# Patient Record
Sex: FEMALE | Race: WHITE | NOT HISPANIC OR LATINO | Employment: OTHER | URBAN - METROPOLITAN AREA
[De-identification: names, ages, dates, MRNs, and addresses within clinical notes are randomized per-mention and may not be internally consistent; named-entity substitution may affect disease eponyms.]

---

## 2021-02-19 ENCOUNTER — IMMUNIZATION (OUTPATIENT)
Dept: NURSING | Facility: CLINIC | Age: 86
End: 2021-02-19
Payer: COMMERCIAL

## 2021-02-19 PROCEDURE — 91300 PR COVID VAC PFIZER DIL RECON 30 MCG/0.3 ML IM: CPT

## 2021-02-19 PROCEDURE — 0001A PR COVID VAC PFIZER DIL RECON 30 MCG/0.3 ML IM: CPT

## 2021-03-12 ENCOUNTER — IMMUNIZATION (OUTPATIENT)
Dept: NURSING | Facility: CLINIC | Age: 86
End: 2021-03-12
Attending: INTERNAL MEDICINE
Payer: COMMERCIAL

## 2021-03-12 PROCEDURE — 91300 PR COVID VAC PFIZER DIL RECON 30 MCG/0.3 ML IM: CPT

## 2021-03-12 PROCEDURE — 0002A PR COVID VAC PFIZER DIL RECON 30 MCG/0.3 ML IM: CPT

## 2021-03-21 ENCOUNTER — HEALTH MAINTENANCE LETTER (OUTPATIENT)
Age: 86
End: 2021-03-21

## 2021-09-05 ENCOUNTER — HEALTH MAINTENANCE LETTER (OUTPATIENT)
Age: 86
End: 2021-09-05

## 2022-04-17 ENCOUNTER — HEALTH MAINTENANCE LETTER (OUTPATIENT)
Age: 87
End: 2022-04-17

## 2022-10-23 ENCOUNTER — HEALTH MAINTENANCE LETTER (OUTPATIENT)
Age: 87
End: 2022-10-23

## 2023-06-01 ENCOUNTER — HEALTH MAINTENANCE LETTER (OUTPATIENT)
Age: 88
End: 2023-06-01

## 2023-10-12 ENCOUNTER — APPOINTMENT (OUTPATIENT)
Dept: GENERAL RADIOLOGY | Facility: CLINIC | Age: 88
End: 2023-10-12
Attending: EMERGENCY MEDICINE
Payer: COMMERCIAL

## 2023-10-12 ENCOUNTER — HOSPITAL ENCOUNTER (EMERGENCY)
Facility: CLINIC | Age: 88
Discharge: HOME | End: 2023-10-12
Attending: EMERGENCY MEDICINE | Admitting: EMERGENCY MEDICINE
Payer: COMMERCIAL

## 2023-10-12 VITALS
WEIGHT: 145 LBS | SYSTOLIC BLOOD PRESSURE: 160 MMHG | DIASTOLIC BLOOD PRESSURE: 59 MMHG | OXYGEN SATURATION: 96 % | TEMPERATURE: 97.9 F | HEART RATE: 59 BPM | RESPIRATION RATE: 18 BRPM

## 2023-10-12 DIAGNOSIS — S82.64XA CLOSED NONDISPLACED FRACTURE OF LATERAL MALLEOLUS OF RIGHT FIBULA, INITIAL ENCOUNTER: ICD-10-CM

## 2023-10-12 PROCEDURE — 99284 EMERGENCY DEPT VISIT MOD MDM: CPT | Mod: 25

## 2023-10-12 PROCEDURE — 27786 TREATMENT OF ANKLE FRACTURE: CPT | Mod: RT

## 2023-10-12 PROCEDURE — 250N000013 HC RX MED GY IP 250 OP 250 PS 637: Performed by: EMERGENCY MEDICINE

## 2023-10-12 PROCEDURE — 73610 X-RAY EXAM OF ANKLE: CPT | Mod: RT

## 2023-10-12 RX ORDER — ACETAMINOPHEN 325 MG/1
650 TABLET ORAL ONCE
Status: COMPLETED | OUTPATIENT
Start: 2023-10-12 | End: 2023-10-12

## 2023-10-12 RX ADMIN — ACETAMINOPHEN 650 MG: 325 TABLET, FILM COATED ORAL at 01:23

## 2023-10-12 NOTE — ED TRIAGE NOTES
Pt comes in for fall after falling asleep in chair and twisting R ankle and she heard a crunch. Pain severe with movement, unable to bear weight on foot. swelling seen

## 2023-10-12 NOTE — ED PROVIDER NOTES
History     Chief Complaint:  Ankle Pain       The history is provided by the patient.      Jessica Beal is a 88 year old female with a history of osteopenia who presents with a right ankle pain. Earlier tonight, she was sitting up from her chair when she twisted her right ankle and heard a crunch. She did not sustain any other injuries at the time. She continues to have pain in the same ankle since then, and it is exacerbated by movement. Additionally, she is unable to bear weight and the ankle is swollen. She did not take any medications for pain management prior to arrival. She is not on blood thinners.     Independent Historian:   None - Patient Only    Medications:    Lipitor     Past Medical History:    Systolic heart failure   PMR   HTN   Esophageal reflux   Palpitations   Pure hypercholesteremia   IBS   Malignant neoplasm of breast   Paroxsymal ventricular tachycardia   Osteopenia      Past Surgical History:    Cholecystectomy   R breast lumpectomy   L hand carpal tunnel release   R hemiarthroplasty     Physical Exam   Patient Vitals for the past 24 hrs:   BP Temp Temp src Pulse Resp SpO2 Weight   10/12/23 0010 (!) 160/59 97.9  F (36.6  C) Temporal 59 18 96 % 65.8 kg (145 lb)        Physical Exam  General: Sitting up in bed  Eyes:  The pupils are equal and round    Conjunctivae and sclerae are normal  ENT:    Atraumatic face  Neck:  Normal range of motion  CV:  Regular rate     DP/PT pulses 2+ on right    Skin warm and well perfused   Resp:  Non labored breathing on room air    No tachypnea    No cough heard  MS:  Mild swelling on the right lateral ankle tender on this area.  No right midfoot tenderness.  No tenderness on proximal fibula or tibia  Skin:  No rash or acute skin lesions noted  Neuro:   Awake, alert.      Speech is normal and fluent.    Face is symmetric.     Moves all extremities equally    Sensation intact to light touch on right foot  Psych: Normal affect.  Appropriate  interactions.      Emergency Department Course   Imaging:  Ankle XR, G/E 3 views, right  Final Result  IMPRESSION: Subtle undisplaced distal right fibular metaphyseal fracture. Ankle mortise is symmetric. Talar dome is smooth. Plantar calcaneal spur. Soft tissue swelling distal right leg and about the right ankle, more laterally.     Results per radiology     Emergency Department Course & Assessments:    Interventions:  0123 Tylenol 650 mg PO    Assessments:  0204 I obtained history and examined the patient as noted above.  0242 The patient was able to walk with a boot and walker. I discussed plan for discharge home.    Independent Interpretation (X-rays, CTs, rhythm strip):  I reviewed the x-ray -very subtle fracture seen on fibula    Consultations/Discussion of Management or Tests:  None     Social Determinants of Health affecting care:   None    Disposition:  The patient was discharged to home.     Impression & Plan    Medical Decision Making:  Jessica Beal is a 88-year-old female who presented to the emergency department with ankle pain.  She thinks her foot fell asleep after she fell asleep in the chair.  When she tried to get up, her foot rolled and she had immediate pain.  X-ray shows very subtle right nondisplaced fibular fracture.  Patient was placed in a boot.  Given that it is nondisplaced and in order to avoid hospitalization as she would not be able to use crutches and not put weight on the foot, recommend using walker to assist her with walking.  Discussed that there is always a chance that the fracture becomes displaced with ambulation.  Recommend follow-up with orthopedics.  Declined opioids.  This is her only injury. Patient in agreement with plan.    Diagnosis:    ICD-10-CM    1. Closed nondisplaced fracture of lateral malleolus of right fibula, initial encounter  S82.64XA            Scribe Disclosure:  Osmani UGALDE, am serving as a scribe at 1:46 AM on 10/12/2023 to document services  personally performed by Snow Mueller MD based on my observations and the provider's statements to me.   10/12/2023   Snow Mueller MD Goertz, Maria Kristine, MD  10/12/23 0961

## 2023-10-12 NOTE — DISCHARGE INSTRUCTIONS
Keep foot in boot until seen by orthopedics  Elevate as much as possible  Call orthopedics in morning to schedule appointment  Use walker for walking  Tylenol for pain  Watch for severe pain, numbness, tingling or weakness on leg/foot

## 2023-11-08 ENCOUNTER — DOCUMENTATION ONLY (OUTPATIENT)
Dept: EMERGENCY MEDICINE | Facility: CLINIC | Age: 88
End: 2023-11-08
Payer: COMMERCIAL

## 2023-11-08 DIAGNOSIS — S82.64XA CLOSED NONDISPLACED FRACTURE OF LATERAL MALLEOLUS OF RIGHT FIBULA, INITIAL ENCOUNTER: Primary | ICD-10-CM

## 2024-06-02 ENCOUNTER — HEALTH MAINTENANCE LETTER (OUTPATIENT)
Age: 89
End: 2024-06-02

## 2025-06-15 ENCOUNTER — HEALTH MAINTENANCE LETTER (OUTPATIENT)
Age: 89
End: 2025-06-15